# Patient Record
Sex: FEMALE
[De-identification: names, ages, dates, MRNs, and addresses within clinical notes are randomized per-mention and may not be internally consistent; named-entity substitution may affect disease eponyms.]

---

## 2022-11-07 ENCOUNTER — NURSE TRIAGE (OUTPATIENT)
Dept: OTHER | Facility: CLINIC | Age: 32
End: 2022-11-07

## 2022-11-07 NOTE — TELEPHONE ENCOUNTER
[facility] of patient: [facility] call from Maxine Funez at [facility] Name: Sandeep Starks MRN: [id number]    Subjective: Caller states \"I was supposed to have a sonogram to determine if my baby was viable, but last night I was in a lot of pain then I had a big gush of blood\"     Current Symptoms: Vaginal bleeding    Onset: Last night      Weeks of gestation: Around 10    Active vaginal bleeding: heavy      Vaginal fluid/discharge: large with clots      Triage indicates for patient to Go to ED Now    Care advice provided, patient verbalizes understanding; denies any other questions or concerns; instructed to call back for any new or worsening symptoms.     Patient/caller agrees to proceed to local Emergency Department      Reason for Disposition   [1] SEVERE vaginal bleeding (e.g., soaking 2 pads per hour, large blood clots) AND [2] present 2 or more hours    Protocols used: Pregnancy - Abdominal Pain Less Than 20 Weeks EGA-ADULT-AH

## 2023-08-05 ENCOUNTER — NURSE TRIAGE (OUTPATIENT)
Dept: OTHER | Facility: CLINIC | Age: 33
End: 2023-08-05

## 2023-08-05 NOTE — TELEPHONE ENCOUNTER
Location of patient: [place] This ust be entered in the demographics. Received call from GREGORY at [facility] Name: Rodolfo Castorena MRN: [id number]    Subjective: Caller states \"I am 8 months pregnant and I don't know what to do - About 10 days ago my children got pink eye\"     Current Symptoms: Possible pink symptoms: eye swollen shut this morning, yellowish-green crusty; thick gooey infection, no fever, sclera is pink, mild itching, bottom eyelid hurts - red and puffy,     Onset: 1 hour ago; rapid    Associated Symptoms: NA    Pain Severity: 0/10; N/A; none    What has been tried: Nothing - only cleaning    What makes it better or worse: Cleaning out the eye helped this morning    Triage indicates for patient to Go to Office Now    Care advice provided, patient verbalizes understanding; denies any other questions or concerns; instructed to call back for any new or worsening symptoms.     Patient/caller agrees to proceed to nearest [facility]       This triage is a result of a call to the [address]    Reason for Disposition   Redness of white area (sclera) of eye(s)   Eyelids are very swollen (shut or almost)    Protocols used: Eye - Swelling-ADULT-[place], Eye - Red Without Pus-ADULT-[place]

## 2023-08-05 NOTE — TELEPHONE ENCOUNTER
[place] This must be entered in the demographics. Received call from [facility] at [facility] Name: Cheryl Brumfield MRN: [id number]    Subjective: Caller states \"pregnant, thinks she has pink eye, children had it last week, can she take the drops left over, she is 34 weeks pregnant\"     Current Symptoms: woke up today and noticed her right eye was swollen and yellow/green crust on eyelashes, white of eye is pinkish, thick discharge that is yellowish green. Denies fever. Onset: a few hours ago; sudden    LMP or Due Date: 23    Weeks of gestation: 29    Fetal Movement: the patients states that the baby moves as usual    Active vaginal bleeding: none  # of pads/hour: n/a    Vaginal fluid/discharge:  n/a    Contractions: none; Sexual intercourse in last 2 days: NO     (# of pregnancies): 4   Para (# of live births): 3    Past complications with pregnancy/delivery: one miscarriage, breech    Triage indicates for patient to  Call PCP within 24 hours    Care advice provided, patient verbalizes understanding; denies any other questions or concerns; instructed to call back for any new or worsening symptoms. Writer spoke with the on-call Dr Glo Mckay who said patient could use the polymycin sulfate drops she already had on hand from her child having pink eye for no more than  5 days. Writer called patient back to let her know this information. Writer advised her to call back on Monday if she is not having any improvement.       This triage is a result of a call to the [address]    Reason for Disposition   [1] Eye with yellow or green discharge, or eyelashes stick together AND [2] NO PCP standing order to call in antibiotic eye drops   (Exception: [place]; continue triage.)    Protocols used: Eye - Pus or Discharge-ADULT-AH